# Patient Record
Sex: MALE | Race: OTHER | HISPANIC OR LATINO | URBAN - METROPOLITAN AREA
[De-identification: names, ages, dates, MRNs, and addresses within clinical notes are randomized per-mention and may not be internally consistent; named-entity substitution may affect disease eponyms.]

---

## 2024-01-03 ENCOUNTER — INPATIENT (INPATIENT)
Facility: HOSPITAL | Age: 69
LOS: 2 days | Discharge: HOME | DRG: 419 | End: 2024-01-06
Attending: SURGERY | Admitting: SURGERY
Payer: COMMERCIAL

## 2024-01-03 VITALS
HEART RATE: 77 BPM | TEMPERATURE: 98 F | DIASTOLIC BLOOD PRESSURE: 82 MMHG | SYSTOLIC BLOOD PRESSURE: 171 MMHG | RESPIRATION RATE: 20 BRPM | HEIGHT: 66 IN | WEIGHT: 169.98 LBS | OXYGEN SATURATION: 99 %

## 2024-01-03 PROCEDURE — 99285 EMERGENCY DEPT VISIT HI MDM: CPT

## 2024-01-03 NOTE — ED ADULT TRIAGE NOTE - MEANS OF ARRIVAL
Thank you for choosing the [facility] Developmental [facility] Behavioral Pediatrics Department for your care!     To Schedule appointments please contact the [facility] at [phone].   For refills please call the [facility] [phone] or contact us via your [employer] account.  Please allow 5-7 days for your refill request to be processed and sent to your pharmacy.   For behavioral emergencies (immediate concern for your child s safety or the safety of another) please contact the [facility] at [phone], go to your local Emergency Department or call [phone].     For non-emergencies contact the [facility] at [phone] or reach out to us via [employer]. Please allow 3 business days for a response.     ambulatory

## 2024-01-04 ENCOUNTER — TRANSCRIPTION ENCOUNTER (OUTPATIENT)
Age: 69
End: 2024-01-04

## 2024-01-04 DIAGNOSIS — K81.0 ACUTE CHOLECYSTITIS: ICD-10-CM

## 2024-01-04 DIAGNOSIS — Z90.49 ACQUIRED ABSENCE OF OTHER SPECIFIED PARTS OF DIGESTIVE TRACT: Chronic | ICD-10-CM

## 2024-01-04 LAB
ALBUMIN SERPL ELPH-MCNC: 4 G/DL — SIGNIFICANT CHANGE UP (ref 3.3–5)
ALBUMIN SERPL ELPH-MCNC: 4 G/DL — SIGNIFICANT CHANGE UP (ref 3.3–5)
ALP SERPL-CCNC: 83 U/L — SIGNIFICANT CHANGE UP (ref 40–120)
ALP SERPL-CCNC: 83 U/L — SIGNIFICANT CHANGE UP (ref 40–120)
ALT FLD-CCNC: 26 U/L — SIGNIFICANT CHANGE UP (ref 10–45)
ALT FLD-CCNC: 26 U/L — SIGNIFICANT CHANGE UP (ref 10–45)
ANION GAP SERPL CALC-SCNC: 11 MMOL/L — SIGNIFICANT CHANGE UP (ref 5–17)
ANION GAP SERPL CALC-SCNC: 11 MMOL/L — SIGNIFICANT CHANGE UP (ref 5–17)
APTT BLD: 29.4 SEC — SIGNIFICANT CHANGE UP (ref 24.5–35.6)
APTT BLD: 29.4 SEC — SIGNIFICANT CHANGE UP (ref 24.5–35.6)
AST SERPL-CCNC: 24 U/L — SIGNIFICANT CHANGE UP (ref 10–40)
AST SERPL-CCNC: 24 U/L — SIGNIFICANT CHANGE UP (ref 10–40)
BASOPHILS # BLD AUTO: 0.05 K/UL — SIGNIFICANT CHANGE UP (ref 0–0.2)
BASOPHILS # BLD AUTO: 0.05 K/UL — SIGNIFICANT CHANGE UP (ref 0–0.2)
BASOPHILS NFR BLD AUTO: 0.3 % — SIGNIFICANT CHANGE UP (ref 0–2)
BASOPHILS NFR BLD AUTO: 0.3 % — SIGNIFICANT CHANGE UP (ref 0–2)
BILIRUB SERPL-MCNC: 0.2 MG/DL — SIGNIFICANT CHANGE UP (ref 0.2–1.2)
BILIRUB SERPL-MCNC: 0.2 MG/DL — SIGNIFICANT CHANGE UP (ref 0.2–1.2)
BLD GP AB SCN SERPL QL: NEGATIVE — SIGNIFICANT CHANGE UP
BLD GP AB SCN SERPL QL: NEGATIVE — SIGNIFICANT CHANGE UP
BUN SERPL-MCNC: 16 MG/DL — SIGNIFICANT CHANGE UP (ref 7–23)
BUN SERPL-MCNC: 16 MG/DL — SIGNIFICANT CHANGE UP (ref 7–23)
CALCIUM SERPL-MCNC: 9.4 MG/DL — SIGNIFICANT CHANGE UP (ref 8.4–10.5)
CALCIUM SERPL-MCNC: 9.4 MG/DL — SIGNIFICANT CHANGE UP (ref 8.4–10.5)
CHLORIDE SERPL-SCNC: 104 MMOL/L — SIGNIFICANT CHANGE UP (ref 96–108)
CHLORIDE SERPL-SCNC: 104 MMOL/L — SIGNIFICANT CHANGE UP (ref 96–108)
CO2 SERPL-SCNC: 23 MMOL/L — SIGNIFICANT CHANGE UP (ref 22–31)
CO2 SERPL-SCNC: 23 MMOL/L — SIGNIFICANT CHANGE UP (ref 22–31)
CREAT SERPL-MCNC: 0.78 MG/DL — SIGNIFICANT CHANGE UP (ref 0.5–1.3)
CREAT SERPL-MCNC: 0.78 MG/DL — SIGNIFICANT CHANGE UP (ref 0.5–1.3)
EGFR: 98 ML/MIN/1.73M2 — SIGNIFICANT CHANGE UP
EGFR: 98 ML/MIN/1.73M2 — SIGNIFICANT CHANGE UP
EOSINOPHIL # BLD AUTO: 0 K/UL — SIGNIFICANT CHANGE UP (ref 0–0.5)
EOSINOPHIL # BLD AUTO: 0 K/UL — SIGNIFICANT CHANGE UP (ref 0–0.5)
EOSINOPHIL NFR BLD AUTO: 0 % — SIGNIFICANT CHANGE UP (ref 0–6)
EOSINOPHIL NFR BLD AUTO: 0 % — SIGNIFICANT CHANGE UP (ref 0–6)
GLUCOSE SERPL-MCNC: 139 MG/DL — HIGH (ref 70–99)
GLUCOSE SERPL-MCNC: 139 MG/DL — HIGH (ref 70–99)
HCT VFR BLD CALC: 42.8 % — SIGNIFICANT CHANGE UP (ref 39–50)
HCT VFR BLD CALC: 42.8 % — SIGNIFICANT CHANGE UP (ref 39–50)
HGB BLD-MCNC: 14 G/DL — SIGNIFICANT CHANGE UP (ref 13–17)
HGB BLD-MCNC: 14 G/DL — SIGNIFICANT CHANGE UP (ref 13–17)
IMM GRANULOCYTES NFR BLD AUTO: 0.7 % — SIGNIFICANT CHANGE UP (ref 0–0.9)
IMM GRANULOCYTES NFR BLD AUTO: 0.7 % — SIGNIFICANT CHANGE UP (ref 0–0.9)
INR BLD: 1.03 RATIO — SIGNIFICANT CHANGE UP (ref 0.85–1.18)
INR BLD: 1.03 RATIO — SIGNIFICANT CHANGE UP (ref 0.85–1.18)
LIDOCAIN IGE QN: 22 U/L — SIGNIFICANT CHANGE UP (ref 7–60)
LIDOCAIN IGE QN: 22 U/L — SIGNIFICANT CHANGE UP (ref 7–60)
LYMPHOCYTES # BLD AUTO: 0.5 K/UL — LOW (ref 1–3.3)
LYMPHOCYTES # BLD AUTO: 0.5 K/UL — LOW (ref 1–3.3)
LYMPHOCYTES # BLD AUTO: 2.8 % — LOW (ref 13–44)
LYMPHOCYTES # BLD AUTO: 2.8 % — LOW (ref 13–44)
MCHC RBC-ENTMCNC: 29.9 PG — SIGNIFICANT CHANGE UP (ref 27–34)
MCHC RBC-ENTMCNC: 29.9 PG — SIGNIFICANT CHANGE UP (ref 27–34)
MCHC RBC-ENTMCNC: 32.7 GM/DL — SIGNIFICANT CHANGE UP (ref 32–36)
MCHC RBC-ENTMCNC: 32.7 GM/DL — SIGNIFICANT CHANGE UP (ref 32–36)
MCV RBC AUTO: 91.5 FL — SIGNIFICANT CHANGE UP (ref 80–100)
MCV RBC AUTO: 91.5 FL — SIGNIFICANT CHANGE UP (ref 80–100)
MONOCYTES # BLD AUTO: 0.35 K/UL — SIGNIFICANT CHANGE UP (ref 0–0.9)
MONOCYTES # BLD AUTO: 0.35 K/UL — SIGNIFICANT CHANGE UP (ref 0–0.9)
MONOCYTES NFR BLD AUTO: 2 % — SIGNIFICANT CHANGE UP (ref 2–14)
MONOCYTES NFR BLD AUTO: 2 % — SIGNIFICANT CHANGE UP (ref 2–14)
NEUTROPHILS # BLD AUTO: 16.53 K/UL — HIGH (ref 1.8–7.4)
NEUTROPHILS # BLD AUTO: 16.53 K/UL — HIGH (ref 1.8–7.4)
NEUTROPHILS NFR BLD AUTO: 94.2 % — HIGH (ref 43–77)
NEUTROPHILS NFR BLD AUTO: 94.2 % — HIGH (ref 43–77)
NRBC # BLD: 0 /100 WBCS — SIGNIFICANT CHANGE UP (ref 0–0)
NRBC # BLD: 0 /100 WBCS — SIGNIFICANT CHANGE UP (ref 0–0)
PLATELET # BLD AUTO: 295 K/UL — SIGNIFICANT CHANGE UP (ref 150–400)
PLATELET # BLD AUTO: 295 K/UL — SIGNIFICANT CHANGE UP (ref 150–400)
POTASSIUM SERPL-MCNC: 4.2 MMOL/L — SIGNIFICANT CHANGE UP (ref 3.5–5.3)
POTASSIUM SERPL-MCNC: 4.2 MMOL/L — SIGNIFICANT CHANGE UP (ref 3.5–5.3)
POTASSIUM SERPL-SCNC: 4.2 MMOL/L — SIGNIFICANT CHANGE UP (ref 3.5–5.3)
POTASSIUM SERPL-SCNC: 4.2 MMOL/L — SIGNIFICANT CHANGE UP (ref 3.5–5.3)
PROT SERPL-MCNC: 7.5 G/DL — SIGNIFICANT CHANGE UP (ref 6–8.3)
PROT SERPL-MCNC: 7.5 G/DL — SIGNIFICANT CHANGE UP (ref 6–8.3)
PROTHROM AB SERPL-ACNC: 11.3 SEC — SIGNIFICANT CHANGE UP (ref 9.5–13)
PROTHROM AB SERPL-ACNC: 11.3 SEC — SIGNIFICANT CHANGE UP (ref 9.5–13)
RBC # BLD: 4.68 M/UL — SIGNIFICANT CHANGE UP (ref 4.2–5.8)
RBC # BLD: 4.68 M/UL — SIGNIFICANT CHANGE UP (ref 4.2–5.8)
RBC # FLD: 13.5 % — SIGNIFICANT CHANGE UP (ref 10.3–14.5)
RBC # FLD: 13.5 % — SIGNIFICANT CHANGE UP (ref 10.3–14.5)
RH IG SCN BLD-IMP: POSITIVE — SIGNIFICANT CHANGE UP
RH IG SCN BLD-IMP: POSITIVE — SIGNIFICANT CHANGE UP
SODIUM SERPL-SCNC: 138 MMOL/L — SIGNIFICANT CHANGE UP (ref 135–145)
SODIUM SERPL-SCNC: 138 MMOL/L — SIGNIFICANT CHANGE UP (ref 135–145)
WBC # BLD: 17.55 K/UL — HIGH (ref 3.8–10.5)
WBC # BLD: 17.55 K/UL — HIGH (ref 3.8–10.5)
WBC # FLD AUTO: 17.55 K/UL — HIGH (ref 3.8–10.5)
WBC # FLD AUTO: 17.55 K/UL — HIGH (ref 3.8–10.5)

## 2024-01-04 PROCEDURE — 76705 ECHO EXAM OF ABDOMEN: CPT | Mod: 26

## 2024-01-04 PROCEDURE — 71046 X-RAY EXAM CHEST 2 VIEWS: CPT | Mod: 26

## 2024-01-04 PROCEDURE — 78227 HEPATOBIL SYST IMAGE W/DRUG: CPT | Mod: 26

## 2024-01-04 PROCEDURE — 99222 1ST HOSP IP/OBS MODERATE 55: CPT | Mod: 57

## 2024-01-04 PROCEDURE — 74177 CT ABD & PELVIS W/CONTRAST: CPT | Mod: 26,MA

## 2024-01-04 RX ORDER — ACETAMINOPHEN 500 MG
1000 TABLET ORAL EVERY 6 HOURS
Refills: 0 | Status: DISCONTINUED | OUTPATIENT
Start: 2024-01-04 | End: 2024-01-05

## 2024-01-04 RX ORDER — SODIUM CHLORIDE 9 MG/ML
1000 INJECTION, SOLUTION INTRAVENOUS
Refills: 0 | Status: DISCONTINUED | OUTPATIENT
Start: 2024-01-04 | End: 2024-01-04

## 2024-01-04 RX ORDER — ERTAPENEM SODIUM 1 G/1
1000 INJECTION, POWDER, LYOPHILIZED, FOR SOLUTION INTRAMUSCULAR; INTRAVENOUS EVERY 24 HOURS
Refills: 0 | Status: DISCONTINUED | OUTPATIENT
Start: 2024-01-05 | End: 2024-01-05

## 2024-01-04 RX ORDER — SUCRALFATE 1 G
1 TABLET ORAL ONCE
Refills: 0 | Status: COMPLETED | OUTPATIENT
Start: 2024-01-04 | End: 2024-01-04

## 2024-01-04 RX ORDER — LIDOCAINE 4 G/100G
10 CREAM TOPICAL ONCE
Refills: 0 | Status: COMPLETED | OUTPATIENT
Start: 2024-01-04 | End: 2024-01-04

## 2024-01-04 RX ORDER — ERTAPENEM SODIUM 1 G/1
1000 INJECTION, POWDER, LYOPHILIZED, FOR SOLUTION INTRAMUSCULAR; INTRAVENOUS ONCE
Refills: 0 | Status: COMPLETED | OUTPATIENT
Start: 2024-01-04 | End: 2024-01-04

## 2024-01-04 RX ORDER — SODIUM CHLORIDE 9 MG/ML
1000 INJECTION, SOLUTION INTRAVENOUS
Refills: 0 | Status: COMPLETED | OUTPATIENT
Start: 2024-01-04 | End: 2024-12-02

## 2024-01-04 RX ORDER — SODIUM CHLORIDE 9 MG/ML
1000 INJECTION INTRAMUSCULAR; INTRAVENOUS; SUBCUTANEOUS ONCE
Refills: 0 | Status: COMPLETED | OUTPATIENT
Start: 2024-01-04 | End: 2024-01-04

## 2024-01-04 RX ORDER — ACETAMINOPHEN 500 MG
1000 TABLET ORAL ONCE
Refills: 0 | Status: COMPLETED | OUTPATIENT
Start: 2024-01-04 | End: 2024-01-04

## 2024-01-04 RX ORDER — ENOXAPARIN SODIUM 100 MG/ML
40 INJECTION SUBCUTANEOUS EVERY 24 HOURS
Refills: 0 | Status: DISCONTINUED | OUTPATIENT
Start: 2024-01-04 | End: 2024-01-05

## 2024-01-04 RX ADMIN — SODIUM CHLORIDE 115 MILLILITER(S): 9 INJECTION, SOLUTION INTRAVENOUS at 15:35

## 2024-01-04 RX ADMIN — ERTAPENEM SODIUM 1000 MILLIGRAM(S): 1 INJECTION, POWDER, LYOPHILIZED, FOR SOLUTION INTRAMUSCULAR; INTRAVENOUS at 10:47

## 2024-01-04 RX ADMIN — SODIUM CHLORIDE 1000 MILLILITER(S): 9 INJECTION INTRAMUSCULAR; INTRAVENOUS; SUBCUTANEOUS at 03:12

## 2024-01-04 RX ADMIN — Medication 400 MILLIGRAM(S): at 03:12

## 2024-01-04 RX ADMIN — ENOXAPARIN SODIUM 40 MILLIGRAM(S): 100 INJECTION SUBCUTANEOUS at 15:35

## 2024-01-04 RX ADMIN — Medication 1 GRAM(S): at 03:29

## 2024-01-04 RX ADMIN — Medication 30 MILLILITER(S): at 03:28

## 2024-01-04 RX ADMIN — ERTAPENEM SODIUM 120 MILLIGRAM(S): 1 INJECTION, POWDER, LYOPHILIZED, FOR SOLUTION INTRAMUSCULAR; INTRAVENOUS at 10:17

## 2024-01-04 RX ADMIN — LIDOCAINE 10 MILLILITER(S): 4 CREAM TOPICAL at 03:28

## 2024-01-04 NOTE — ED ADULT NURSE REASSESSMENT NOTE - NS ED NURSE REASSESS COMMENT FT1
Report taken from JENIFER Bryan. Pt introduced to oncoming RN and updated on plan of care. A&Ox4, breathing unlabored, skin warm dry and intact. Reports still feeling abd pain, but improved. Call bell in reach, pt educated on use. Bed locked and in lowest position. Denies any other needs or complaints at this time. Pending CT scan. Report taken from JENIFER Bryna. Pt introduced to oncoming RN and updated on plan of care. A&Ox4, breathing unlabored, skin warm dry and intact. Reports still feeling abd pain, but improved. Call bell in reach, pt educated on use. Bed locked and in lowest position. Denies any other needs or complaints at this time. Pending CT scan.

## 2024-01-04 NOTE — H&P ADULT - VTE RISK ASSESSMENT
[facility] CALLED AND SAID THE LORAZEPAM 1MG NEEDS PRIOR AUTHORIZATION    PRIOR AUTH #  [id number]  [facility]  [phone] VTE Assessment already completed for this visit

## 2024-01-04 NOTE — PRE PROCEDURE NOTE - PRE PROCEDURE EVALUATION
PRE-OPERATIVE PROCEDURE NOTE      Patient Age: 68y    Patient Gender: male    Procedure: Laparoscopic Cholecystectomy possible Open    Attending Surgeon: Dr. Guevara    Diagnosis/Indication: Acute Cholecystitis     Pertinent Medical History: Specified in H&P    Pertinent labs:                      14.0   17.55 )-----------( 295      ( 04 Jan 2024 03:17 )             42.8       01-04    138  |  104  |  16  ----------------------------<  139<H>  4.2   |  23  |  0.78    Ca    9.4      04 Jan 2024 03:17    TPro  7.5  /  Alb  4.0  /  TBili  0.2  /  DBili  x   /  AST  24  /  ALT  26  /  AlkPhos  83  01-04      PT/INR - ( 04 Jan 2024 03:17 )   PT: 11.3 sec;   INR: 1.03 ratio         PTT - ( 04 Jan 2024 03:17 )  PTT:29.4 sec    Patient and Family Aware ? Yes    Plan:  - Consent obtained  - Preop labs ordered  - NPO at midnight, will start IVF    ACS Trauma [id number]                                           PRE-OPERATIVE PROCEDURE NOTE      Patient Age: 68y    Patient Gender: male    Procedure: Laparoscopic Cholecystectomy possible Open    Attending Surgeon: Dr. Guevara    Diagnosis/Indication: Acute Cholecystitis     Pertinent Medical History: Specified in H&P    Pertinent labs:                      14.0   17.55 )-----------( 295      ( 04 Jan 2024 03:17 )             42.8       01-04    138  |  104  |  16  ----------------------------<  139<H>  4.2   |  23  |  0.78    Ca    9.4      04 Jan 2024 03:17    TPro  7.5  /  Alb  4.0  /  TBili  0.2  /  DBili  x   /  AST  24  /  ALT  26  /  AlkPhos  83  01-04      PT/INR - ( 04 Jan 2024 03:17 )   PT: 11.3 sec;   INR: 1.03 ratio         PTT - ( 04 Jan 2024 03:17 )  PTT:29.4 sec    Patient and Family Aware ? Yes    Plan:  - Consent obtained  - Preop labs ordered  - NPO at midnight, will start IVF    ACS Trauma [id number]

## 2024-01-04 NOTE — H&P ADULT - ATTENDING COMMENTS
ATTENDING ATTESTATION  I have seen and examined this patient with the resident housestaff. I have reviewed all labs, imaging and reports. I have participated in formulating the plan, and have read and agree with the history, ROS, exam, assessment and plan as stated above.     Dx: acute cholecystitis  epigastric pain for 1 day with RUQ tenderness.   WBC 17  lft's nml  US: sludge, CBD 4mm  + HIDA  Plan for admission with abx, schedule for lap garcia tomorrow given very long add-on list on day of presentation.     Total time spent in the care of this patient today (excluding critical care, teaching & procedures): 55 min                 Over 50% of the total time was spent on counseling and coordination of care.     Jessica Mcbride M.D., M.S.  Division of Acute Care Surgery

## 2024-01-04 NOTE — H&P ADULT - ASSESSMENT
68 year old male with likely acute cholecystitis based on clinical presentation and CT radiographic findings. Presenting with leukocytosis.    Plan:  - Admit to Acute Care Surgery, Dr. Mcbride  - Will book for OR tomorrow - laparoscopic cholecystectomy, possible open  - NPO  - IV fluid resuscitation  - IV abx: ertapenem  - Pain control  - Med rec completed - patient denies taking any prescription medications at home, only takes TUMS occasionally for GERD symptoms    Discussed with acute care surgery attending on call, Dr. Mcbride.      ACS/Trauma Surgery  [id number] 68 year old male with likely acute cholecystitis based on clinical presentation and CT radiographic findings. Presenting with leukocytosis.    Plan:  - Admit to Acute Care Surgery, Dr. Mcbride  - Will book for OR tomorrow - laparoscopic cholecystectomy, possible open  - NPO  - IV fluid resuscitation  - IV abx: ertapenem  - Pain control  - Med rec completed - patient denies taking any prescription medications at home, only takes TUMS occasionally for GERD symptoms    Discussed with acute care surgery attending on call, Dr. Mcbride.      ACS/Trauma Surgery  [id number]

## 2024-01-04 NOTE — PATIENT PROFILE ADULT - FALL HARM RISK - UNIVERSAL INTERVENTIONS
Bed in lowest position, wheels locked, appropriate side rails in place/Call bell, personal items and telephone in reach/Instruct patient to call for assistance before getting out of bed or chair/Non-slip footwear when patient is out of bed/[place] to call system/Physically safe environment - no spills, clutter or unnecessary equipment/Purposeful Proactive Rounding/Room/bathroom lighting operational, light cord in reach Bed in lowest position, wheels locked, appropriate side rails in place/Call bell, personal items and telephone in reach/Instruct patient to call for assistance before getting out of bed or chair/Non-slip footwear when patient is out of bed/[place] to call system/Physically safe environment - no spills, clutter or unnecessary equipment/Purposeful Proactive Rounding/Room/bathroom lighting operational, light cord in reach

## 2024-01-04 NOTE — ED ADULT NURSE NOTE - NSFALLUNIVINTERV_ED_ALL_ED
Bed/Stretcher in lowest position, wheels locked, appropriate side rails in place/Call bell, personal items and telephone in reach/Instruct patient to call for assistance before getting out of bed/chair/stretcher/Non-slip footwear applied when patient is off stretcher/[place] to call system/Physically safe environment - no spills, clutter or unnecessary equipment/Purposeful proactive rounding/Room/bathroom lighting operational, light cord in reach Bed/Stretcher in lowest position, wheels locked, appropriate side rails in place/Call bell, personal items and telephone in reach/Instruct patient to call for assistance before getting out of bed/chair/stretcher/Non-slip footwear applied when patient is off stretcher/[place] to call system/Physically safe environment - no spills, clutter or unnecessary equipment/Purposeful proactive rounding/Room/bathroom lighting operational, light cord in reach

## 2024-01-04 NOTE — ED PROVIDER NOTE - PROGRESS NOTE DETAILS
I received signout on this patient at the usual time, patient was reassessed at bedside, patient reports pain is improved, no nausea or vomiting at this time.  Patient abdomen is soft nontender was "planing of epigastric tenderness prior.  Patient has no right upper quadrant pain, no tenderness or Tiwari sign.  Ultrasound right upper quadrant shows gallbladder sludge with no other sonographic evidence of acute cholecystitis, lipase within normal limits.  Patient pending CT abdomen pelvis giving WBC 17.55. CT shows concern for acute cholecystitis, patient has been n.p.o. while in the ED for over 12 hours, given IV ertapenem, discussed with surgical resident.  HIDA scan ordered. Pt pain controlled at this time, VSS. -Christian Avila PA-C surgery resident requests admission to Dr. Mcbride. rec continue with HIDA daquan. -Christian Avila PA-C

## 2024-01-04 NOTE — H&P ADULT - NSHPLABSRESULTS_GEN_ALL_CORE
14.0   17.55 )-----------( 295      ( 04 Jan 2024 03:17 )             42.8     01-04    138  |  104  |  16  ----------------------------<  139<H>  4.2   |  23  |  0.78    Ca    9.4      04 Jan 2024 03:17    TPro  7.5  /  Alb  4.0  /  TBili  0.2  /  DBili  x   /  AST  24  /  ALT  26  /  AlkPhos  83  01-04      IMAGING:  < from: CT Abdomen and Pelvis w/ IV Cont (01.04.24 @ 08:43) >      ACC: [id number] EXAM:  CT ABDOMEN AND PELVIS IC   ORDERED BY: EDELMIRA HORVATH     PROCEDURE DATE:  01/04/2024          INTERPRETATION:  CLINICAL INFORMATION: Epigastric abdominal pain, high   WBC count.    COMPARISON: Ultrasound abdomen 1/4/2024.    CONTRAST/COMPLICATIONS:  IV Contrast: Omnipaque 350  90 cc administered   10 cc discarded  Oral Contrast: NONE  Complications: None reported at time of study completion    PROCEDURE:  CT of the Abdomen and Pelvis was performed.  Sagittal and coronal reformats were performed.    FINDINGS:  LOWER CHEST: Bibasilar subsegmental atelectasis.    LIVER: Within normal limits.  BILE DUCTS: Normal caliber.  GALLBLADDER: Distended with cholelithiasis. Gallbladder wall thickening   and pericholecystic fat stranding.  SPLEEN: Within normal limits.  PANCREAS: Within normal limits.  ADRENALS: Within normal limits.  KIDNEYS/URETERS: No hydronephrosis. Bilateral renal cysts, the largest   measuring up to 9.2 cm in the left lower pole. Bilateral subcentimeter   hypodensities too small to characterize.    BLADDER: Within normal limits.  REPRODUCTIVE ORGANS: Prostate is enlarged.    BOWEL: No bowel obstruction. Appendix is not visualized. No evidence of   inflammation in the pericecal region. Colonic diverticulosis without   evidence of acute diverticulitis.  PERITONEUM: No ascites.  VESSELS: Atherosclerotic changes.  RETROPERITONEUM/LYMPH NODES: No lymphadenopathy.  ABDOMINAL WALL: Small fat-containing right inguinal hernia.  BONES: Degenerative changes.    IMPRESSION:  Findings suspicious for acute cholecystitis.        --- End of Report ---          LEONIDES GARCIA DO; Resident Radiologist  This document has been electronically signed.  CHARMAINE REID MD; Attending Radiologist  This document has beenelectronically signed. Jan 4 2024  9:40AM    < end of copied text >      < from: US Abdomen Upper Quadrant Right (01.04.24 @ 04:41) >        < end of copied text > 14.0   17.55 )-----------( 295      ( 04 Jan 2024 03:17 )             42.8     01-04    138  |  104  |  16  ----------------------------<  139<H>  4.2   |  23  |  0.78    Ca    9.4      04 Jan 2024 03:17    TPro  7.5  /  Alb  4.0  /  TBili  0.2  /  DBili  x   /  AST  24  /  ALT  26  /  AlkPhos  83  01-04      IMAGING:  < from: CT Abdomen and Pelvis w/ IV Cont (01.04.24 @ 08:43) >      ACC: [id number] EXAM:  CT ABDOMEN AND PELVIS IC   ORDERED BY: EDELMIRA HORVATH     PROCEDURE DATE:  01/04/2024          INTERPRETATION:  CLINICAL INFORMATION: Epigastric abdominal pain, high   WBC count.    COMPARISON: Ultrasound abdomen 1/4/2024.    CONTRAST/COMPLICATIONS:  IV Contrast: Omnipaque 350  90 cc administered   10 cc discarded  Oral Contrast: NONE  Complications: None reported at time of study completion    PROCEDURE:  CT of the Abdomen and Pelvis was performed.  Sagittal and coronal reformats were performed.    FINDINGS:  LOWER CHEST: Bibasilar subsegmental atelectasis.    LIVER: Within normal limits.  BILE DUCTS: Normal caliber.  GALLBLADDER: Distended with cholelithiasis. Gallbladder wall thickening   and pericholecystic fat stranding.  SPLEEN: Within normal limits.  PANCREAS: Within normal limits.  ADRENALS: Within normal limits.  KIDNEYS/URETERS: No hydronephrosis. Bilateral renal cysts, the largest   measuring up to 9.2 cm in the left lower pole. Bilateral subcentimeter   hypodensities too small to characterize.    BLADDER: Within normal limits.  REPRODUCTIVE ORGANS: Prostate is enlarged.    BOWEL: No bowel obstruction. Appendix is not visualized. No evidence of   inflammation in the pericecal region. Colonic diverticulosis without   evidence of acute diverticulitis.  PERITONEUM: No ascites.  VESSELS: Atherosclerotic changes.  RETROPERITONEUM/LYMPH NODES: No lymphadenopathy.  ABDOMINAL WALL: Small fat-containing right inguinal hernia.  BONES: Degenerative changes.    IMPRESSION:  Findings suspicious for acute cholecystitis.        --- End of Report ---          LEONIDES GARCIA DO; Resident Radiologist  This document has been electronically signed.  CHARMAINE REID MD; Attending Radiologist  This document has beenelectronically signed. Jan 4 2024  9:40AM    < end of copied text >      < from: US Abdomen Upper Quadrant Right (01.04.24 @ 04:41) >        < end of copied text >

## 2024-01-04 NOTE — ED ADULT NURSE NOTE - OBJECTIVE STATEMENT
68y male w/ pmh of GERD presents to ED w/ abdominal pain. Pt states he felt mild pain in his abdomen at 6 pm that has since progressed to severe pain. Pain is located in epigastric region; abdomen soft non distended non tender on palpation. Pt states he vomited three times on way to hospital. Pt endorses chills but did not take temperature. Pt denies diarrhea, urinary symptoms, chest pain, shortness of breath. Pt is ambulatory

## 2024-01-04 NOTE — H&P ADULT - NSHPPHYSICALEXAM_GEN_ALL_CORE
Gen: NAD  CV: Regular rate  Resp: Nonlabored breathing on room air  Abd: Soft, nondistended, TTP in RUQ, well healed RLQ incision  Ext: Warm

## 2024-01-04 NOTE — ED ADULT NURSE NOTE - NS ED NURSE RECORD ANOTHER VITAL SIGN
A1c 7.6%, this is an increase from 6.5% 3 years ago  The goal of A1c will be under 7%, please continue taking metformin and start semaglutide/Ozempic as prescribed  Follow-up in 1 month    Very low vitamin D  I prescribed vitamin D3 50,000 units once a week for 12 weeks  Triglycerides are very high, since you have diabetes you are supposed to be on cholesterol-lowering medications, I prescribed atorvastatin 10 mg once a day  Recommended regular exercise, such as walking 30 minutes daily  Recommended low fat Mediterranean diet: use [place] Virgin Olive oil for cooking and salads dressing, baked/seared chicken , baked/seared fish, nuts, low fat cheese (feta) in small amounts, whole grain bread, hummus, chickpeas, 5 different fruit and vegetables of 5 different colors daily  Kidneys and liver tests are normal  Blood count is normal no anemia  Thyroid test is normal   Yes

## 2024-01-04 NOTE — ED PROVIDER NOTE - ATTENDING CONTRIBUTION TO CARE
Hx: pt with wife at bedside with epigastric pain 30min post eating for several hours now, has had this postprandial pain in past, rx omeprazole, not helping today.  Sharp pain radiating to back.  No fever, vomiting, diarrhea.      PE: well appearing, nontoxic, no respiratory distress.  Neuro nonfocal.  Skin intact. Psych normal mood.  +td epigastric.  Neg Tiwari's sign.  No guarding/rebound.    MDM: epigastric pain, postprandial, r/o perforation, PUD, cholecystitis, pancreatitis, hepatitis.  check cbc r/o anemia or leukocytosis, check bmp to r/o metabolic derangement and lyte imbalance, u/s ruq, cxr r/o free air, meds.

## 2024-01-04 NOTE — PATIENT PROFILE ADULT - NSPROPTRIGHTBILLOFRIGHTS_GEN_A_NUR
Dawna Rubin (MD)  Surgery; Surgical Critical Care; Vascular Surgery  [address], [address]  [place] [address]  Phone: [phone]  Fax: [phone]  Follow Up Time: 1-3 Days
patient

## 2024-01-04 NOTE — H&P ADULT - HISTORY OF PRESENT ILLNESS
Patient is a 68 year old male with no significant PMHx per report presenting with epigastric abdominal pain for the past day. Associated nausea and vomiting. Denies diarrhea, fevers, and chills. Pain was after eating yesterday and has been persistent. Denies prior history of gallstones. Past surgical history for open appendectomy approximately 20 years ago. In ED, CT imaging findings concerning for acute cholecystitis.

## 2024-01-05 ENCOUNTER — TRANSCRIPTION ENCOUNTER (OUTPATIENT)
Age: 69
End: 2024-01-05

## 2024-01-05 LAB
ALBUMIN SERPL ELPH-MCNC: 3.5 G/DL — SIGNIFICANT CHANGE UP (ref 3.3–5)
ALBUMIN SERPL ELPH-MCNC: 3.5 G/DL — SIGNIFICANT CHANGE UP (ref 3.3–5)
ALP SERPL-CCNC: 73 U/L — SIGNIFICANT CHANGE UP (ref 40–120)
ALP SERPL-CCNC: 73 U/L — SIGNIFICANT CHANGE UP (ref 40–120)
ALT FLD-CCNC: 23 U/L — SIGNIFICANT CHANGE UP (ref 10–45)
ALT FLD-CCNC: 23 U/L — SIGNIFICANT CHANGE UP (ref 10–45)
ANION GAP SERPL CALC-SCNC: 5 MMOL/L — SIGNIFICANT CHANGE UP (ref 5–17)
ANION GAP SERPL CALC-SCNC: 5 MMOL/L — SIGNIFICANT CHANGE UP (ref 5–17)
APTT BLD: 29.6 SEC — SIGNIFICANT CHANGE UP (ref 24.5–35.6)
APTT BLD: 29.6 SEC — SIGNIFICANT CHANGE UP (ref 24.5–35.6)
AST SERPL-CCNC: 24 U/L — SIGNIFICANT CHANGE UP (ref 10–40)
AST SERPL-CCNC: 24 U/L — SIGNIFICANT CHANGE UP (ref 10–40)
BILIRUB SERPL-MCNC: 0.3 MG/DL — SIGNIFICANT CHANGE UP (ref 0.2–1.2)
BILIRUB SERPL-MCNC: 0.3 MG/DL — SIGNIFICANT CHANGE UP (ref 0.2–1.2)
BLD GP AB SCN SERPL QL: NEGATIVE — SIGNIFICANT CHANGE UP
BLD GP AB SCN SERPL QL: NEGATIVE — SIGNIFICANT CHANGE UP
BUN SERPL-MCNC: 10 MG/DL — SIGNIFICANT CHANGE UP (ref 7–23)
BUN SERPL-MCNC: 10 MG/DL — SIGNIFICANT CHANGE UP (ref 7–23)
CALCIUM SERPL-MCNC: 8.7 MG/DL — SIGNIFICANT CHANGE UP (ref 8.4–10.5)
CALCIUM SERPL-MCNC: 8.7 MG/DL — SIGNIFICANT CHANGE UP (ref 8.4–10.5)
CHLORIDE SERPL-SCNC: 106 MMOL/L — SIGNIFICANT CHANGE UP (ref 96–108)
CHLORIDE SERPL-SCNC: 106 MMOL/L — SIGNIFICANT CHANGE UP (ref 96–108)
CO2 SERPL-SCNC: 27 MMOL/L — SIGNIFICANT CHANGE UP (ref 22–31)
CO2 SERPL-SCNC: 27 MMOL/L — SIGNIFICANT CHANGE UP (ref 22–31)
CREAT SERPL-MCNC: 0.84 MG/DL — SIGNIFICANT CHANGE UP (ref 0.5–1.3)
CREAT SERPL-MCNC: 0.84 MG/DL — SIGNIFICANT CHANGE UP (ref 0.5–1.3)
EGFR: 96 ML/MIN/1.73M2 — SIGNIFICANT CHANGE UP
EGFR: 96 ML/MIN/1.73M2 — SIGNIFICANT CHANGE UP
GLUCOSE SERPL-MCNC: 100 MG/DL — HIGH (ref 70–99)
GLUCOSE SERPL-MCNC: 100 MG/DL — HIGH (ref 70–99)
HCT VFR BLD CALC: 39.3 % — SIGNIFICANT CHANGE UP (ref 39–50)
HCT VFR BLD CALC: 39.3 % — SIGNIFICANT CHANGE UP (ref 39–50)
HCV AB S/CO SERPL IA: 0.19 S/CO — SIGNIFICANT CHANGE UP (ref 0–0.99)
HCV AB S/CO SERPL IA: 0.19 S/CO — SIGNIFICANT CHANGE UP (ref 0–0.99)
HCV AB SERPL-IMP: SIGNIFICANT CHANGE UP
HCV AB SERPL-IMP: SIGNIFICANT CHANGE UP
HGB BLD-MCNC: 12.8 G/DL — LOW (ref 13–17)
HGB BLD-MCNC: 12.8 G/DL — LOW (ref 13–17)
INR BLD: 1.15 RATIO — SIGNIFICANT CHANGE UP (ref 0.85–1.18)
INR BLD: 1.15 RATIO — SIGNIFICANT CHANGE UP (ref 0.85–1.18)
MAGNESIUM SERPL-MCNC: 2.2 MG/DL — SIGNIFICANT CHANGE UP (ref 1.6–2.6)
MAGNESIUM SERPL-MCNC: 2.2 MG/DL — SIGNIFICANT CHANGE UP (ref 1.6–2.6)
MCHC RBC-ENTMCNC: 29.4 PG — SIGNIFICANT CHANGE UP (ref 27–34)
MCHC RBC-ENTMCNC: 29.4 PG — SIGNIFICANT CHANGE UP (ref 27–34)
MCHC RBC-ENTMCNC: 32.6 GM/DL — SIGNIFICANT CHANGE UP (ref 32–36)
MCHC RBC-ENTMCNC: 32.6 GM/DL — SIGNIFICANT CHANGE UP (ref 32–36)
MCV RBC AUTO: 90.3 FL — SIGNIFICANT CHANGE UP (ref 80–100)
MCV RBC AUTO: 90.3 FL — SIGNIFICANT CHANGE UP (ref 80–100)
NRBC # BLD: 0 /100 WBCS — SIGNIFICANT CHANGE UP (ref 0–0)
NRBC # BLD: 0 /100 WBCS — SIGNIFICANT CHANGE UP (ref 0–0)
PHOSPHATE SERPL-MCNC: 3 MG/DL — SIGNIFICANT CHANGE UP (ref 2.5–4.5)
PHOSPHATE SERPL-MCNC: 3 MG/DL — SIGNIFICANT CHANGE UP (ref 2.5–4.5)
PLATELET # BLD AUTO: 258 K/UL — SIGNIFICANT CHANGE UP (ref 150–400)
PLATELET # BLD AUTO: 258 K/UL — SIGNIFICANT CHANGE UP (ref 150–400)
POTASSIUM SERPL-MCNC: 5.3 MMOL/L — SIGNIFICANT CHANGE UP (ref 3.5–5.3)
POTASSIUM SERPL-MCNC: 5.3 MMOL/L — SIGNIFICANT CHANGE UP (ref 3.5–5.3)
POTASSIUM SERPL-SCNC: 5.3 MMOL/L — SIGNIFICANT CHANGE UP (ref 3.5–5.3)
POTASSIUM SERPL-SCNC: 5.3 MMOL/L — SIGNIFICANT CHANGE UP (ref 3.5–5.3)
PROT SERPL-MCNC: 6.5 G/DL — SIGNIFICANT CHANGE UP (ref 6–8.3)
PROT SERPL-MCNC: 6.5 G/DL — SIGNIFICANT CHANGE UP (ref 6–8.3)
PROTHROM AB SERPL-ACNC: 12 SEC — SIGNIFICANT CHANGE UP (ref 9.5–13)
PROTHROM AB SERPL-ACNC: 12 SEC — SIGNIFICANT CHANGE UP (ref 9.5–13)
RBC # BLD: 4.35 M/UL — SIGNIFICANT CHANGE UP (ref 4.2–5.8)
RBC # BLD: 4.35 M/UL — SIGNIFICANT CHANGE UP (ref 4.2–5.8)
RBC # FLD: 13.5 % — SIGNIFICANT CHANGE UP (ref 10.3–14.5)
RBC # FLD: 13.5 % — SIGNIFICANT CHANGE UP (ref 10.3–14.5)
RH IG SCN BLD-IMP: POSITIVE — SIGNIFICANT CHANGE UP
RH IG SCN BLD-IMP: POSITIVE — SIGNIFICANT CHANGE UP
SODIUM SERPL-SCNC: 138 MMOL/L — SIGNIFICANT CHANGE UP (ref 135–145)
SODIUM SERPL-SCNC: 138 MMOL/L — SIGNIFICANT CHANGE UP (ref 135–145)
WBC # BLD: 9.4 K/UL — SIGNIFICANT CHANGE UP (ref 3.8–10.5)
WBC # BLD: 9.4 K/UL — SIGNIFICANT CHANGE UP (ref 3.8–10.5)
WBC # FLD AUTO: 9.4 K/UL — SIGNIFICANT CHANGE UP (ref 3.8–10.5)
WBC # FLD AUTO: 9.4 K/UL — SIGNIFICANT CHANGE UP (ref 3.8–10.5)

## 2024-01-05 PROCEDURE — 47562 LAPAROSCOPIC CHOLECYSTECTOMY: CPT

## 2024-01-05 PROCEDURE — 99231 SBSQ HOSP IP/OBS SF/LOW 25: CPT | Mod: 24

## 2024-01-05 DEVICE — SURGICEL 2 X 14": Type: IMPLANTABLE DEVICE | Status: FUNCTIONAL

## 2024-01-05 DEVICE — LIGATING CLIPS WECK HEMOLOK POLYMER MEDIUM-LARGE (GREEN) 6: Type: IMPLANTABLE DEVICE | Status: FUNCTIONAL

## 2024-01-05 RX ORDER — OXYCODONE HYDROCHLORIDE 5 MG/1
2.5 TABLET ORAL EVERY 4 HOURS
Refills: 0 | Status: DISCONTINUED | OUTPATIENT
Start: 2024-01-05 | End: 2024-01-06

## 2024-01-05 RX ORDER — ACETAMINOPHEN 500 MG
975 TABLET ORAL EVERY 6 HOURS
Refills: 0 | Status: DISCONTINUED | OUTPATIENT
Start: 2024-01-05 | End: 2024-01-06

## 2024-01-05 RX ORDER — OXYCODONE HYDROCHLORIDE 5 MG/1
1 TABLET ORAL
Qty: 5 | Refills: 0
Start: 2024-01-05

## 2024-01-05 RX ORDER — ONDANSETRON 8 MG/1
4 TABLET, FILM COATED ORAL ONCE
Refills: 0 | Status: DISCONTINUED | OUTPATIENT
Start: 2024-01-05 | End: 2024-01-05

## 2024-01-05 RX ORDER — OXYCODONE HYDROCHLORIDE 5 MG/1
5 TABLET ORAL EVERY 4 HOURS
Refills: 0 | Status: DISCONTINUED | OUTPATIENT
Start: 2024-01-05 | End: 2024-01-06

## 2024-01-05 RX ORDER — HYDROMORPHONE HYDROCHLORIDE 2 MG/ML
0.5 INJECTION INTRAMUSCULAR; INTRAVENOUS; SUBCUTANEOUS
Refills: 0 | Status: DISCONTINUED | OUTPATIENT
Start: 2024-01-05 | End: 2024-01-05

## 2024-01-05 RX ORDER — SODIUM CHLORIDE 9 MG/ML
1000 INJECTION, SOLUTION INTRAVENOUS
Refills: 0 | Status: DISCONTINUED | OUTPATIENT
Start: 2024-01-05 | End: 2024-01-05

## 2024-01-05 RX ORDER — ENOXAPARIN SODIUM 100 MG/ML
40 INJECTION SUBCUTANEOUS EVERY 24 HOURS
Refills: 0 | Status: DISCONTINUED | OUTPATIENT
Start: 2024-01-05 | End: 2024-01-06

## 2024-01-05 RX ADMIN — ERTAPENEM SODIUM 120 MILLIGRAM(S): 1 INJECTION, POWDER, LYOPHILIZED, FOR SOLUTION INTRAMUSCULAR; INTRAVENOUS at 00:30

## 2024-01-05 RX ADMIN — Medication 975 MILLIGRAM(S): at 23:48

## 2024-01-05 RX ADMIN — SODIUM CHLORIDE 100 MILLILITER(S): 9 INJECTION, SOLUTION INTRAVENOUS at 00:31

## 2024-01-05 RX ADMIN — Medication 975 MILLIGRAM(S): at 23:46

## 2024-01-05 RX ADMIN — ENOXAPARIN SODIUM 40 MILLIGRAM(S): 100 INJECTION SUBCUTANEOUS at 14:27

## 2024-01-05 NOTE — DISCHARGE NOTE PROVIDER - HOSPITAL COURSE
68 year old male with no significant PMHx per report presenting with epigastric abdominal pain for the past day. Associated nausea and vomiting. Denies diarrhea, fevers, and chills. Pain was after eating yesterday and has been persistent. Denies prior history of gallstones. Past surgical history for open appendectomy approximately 20 years ago. In ED, CT imaging findings concerning for acute cholecystitis with WBC 17. Patient was admitted, started on invanz. Patient was taken to the OR on 1/5/2024 and underwent a laparoscopic cholecystectomy. Patient recovered well, was tolerating PO and pain was well-controlled at discharge.

## 2024-01-05 NOTE — PROGRESS NOTE ADULT - ASSESSMENT
68 year old male with likely acute cholecystitis based on clinical presentation and CT radiographic findings. Presenting with leukocytosis.    Plan:  - Plan for laparoscopic cholecystectomy, possible open today  - NPO  - IV fluid resuscitation  - IV abx: ertapenem  - Pain control  - Med rec completed - patient denies taking any prescription medications at home, only takes TUMS occasionally for GERD symptoms      ACS/Trauma Surgery  [id number]   68 year old male with likely acute cholecystitis based on clinical presentation and CT radiographic findings. Presenting with leukocytosis.    Plan:  - Plan for laparoscopic cholecystectomy, possible open today  - NPO  - IV fluid resuscitation  - IV abx: ertapenem  - Pain control  - Med rec completed - patient denies taking any prescription medications at home, only takes TUMS occasionally for GERD symptoms      ACS/Trauma Surgery  [id number]

## 2024-01-05 NOTE — DISCHARGE NOTE PROVIDER - CARE PROVIDER_API CALL
[facility]vara [facility]  [facility]gery  [address] [place] [address], [facility] [address]  [place] [address]  Phone: [phone]  Fax: [phone]  Follow Up Time:    [facility]  [address] [place] [address], [facility] [address]  [place] [address]  Phone: [phone]  Fax: [phone]  Follow Up Time:

## 2024-01-05 NOTE — PRE-ANESTHESIA EVALUATION ADULT - NSPREOPDXFT_GEN_ALL_CORE
Pt presents to ED via [facility] from [facility] with CC AMS and fever. Per pt's POA, this is not pt's normal mentation. Per EMS temp was 102. Pt had been given a total of 1300 mg of Tylenol PTA. Pt has hx of suprapubic cath & frequent UTI's. Nursing home had attempted IV placement to right arm, which was unsuccessful. Pt's POA (Anais) did not allow staff to place an additional IV. Pt denies pain. Hx MS.    cholecystitis

## 2024-01-05 NOTE — DISCHARGE NOTE PROVIDER - CARE PROVIDERS DIRECT ADDRESSES
[email or web address]

## 2024-01-06 ENCOUNTER — RESULT REVIEW (OUTPATIENT)
Age: 69
End: 2024-01-06

## 2024-01-06 ENCOUNTER — TRANSCRIPTION ENCOUNTER (OUTPATIENT)
Age: 69
End: 2024-01-06

## 2024-01-06 VITALS
TEMPERATURE: 98 F | SYSTOLIC BLOOD PRESSURE: 127 MMHG | HEART RATE: 61 BPM | RESPIRATION RATE: 18 BRPM | DIASTOLIC BLOOD PRESSURE: 68 MMHG | OXYGEN SATURATION: 96 %

## 2024-01-06 PROCEDURE — 86850 RBC ANTIBODY SCREEN: CPT

## 2024-01-06 PROCEDURE — 88304 TISSUE EXAM BY PATHOLOGIST: CPT | Mod: 26

## 2024-01-06 PROCEDURE — 85027 COMPLETE CBC AUTOMATED: CPT

## 2024-01-06 PROCEDURE — 85610 PROTHROMBIN TIME: CPT

## 2024-01-06 PROCEDURE — 85730 THROMBOPLASTIN TIME PARTIAL: CPT

## 2024-01-06 PROCEDURE — 71046 X-RAY EXAM CHEST 2 VIEWS: CPT

## 2024-01-06 PROCEDURE — 83735 ASSAY OF MAGNESIUM: CPT

## 2024-01-06 PROCEDURE — 96375 TX/PRO/DX INJ NEW DRUG ADDON: CPT

## 2024-01-06 PROCEDURE — 84100 ASSAY OF PHOSPHORUS: CPT

## 2024-01-06 PROCEDURE — 76705 ECHO EXAM OF ABDOMEN: CPT

## 2024-01-06 PROCEDURE — A9537: CPT

## 2024-01-06 PROCEDURE — 74177 CT ABD & PELVIS W/CONTRAST: CPT | Mod: MA

## 2024-01-06 PROCEDURE — 85025 COMPLETE CBC W/AUTO DIFF WBC: CPT

## 2024-01-06 PROCEDURE — 80053 COMPREHEN METABOLIC PANEL: CPT

## 2024-01-06 PROCEDURE — 88304 TISSUE EXAM BY PATHOLOGIST: CPT

## 2024-01-06 PROCEDURE — 96365 THER/PROPH/DIAG IV INF INIT: CPT

## 2024-01-06 PROCEDURE — 99285 EMERGENCY DEPT VISIT HI MDM: CPT | Mod: 25

## 2024-01-06 PROCEDURE — C9399: CPT

## 2024-01-06 PROCEDURE — C1889: CPT

## 2024-01-06 PROCEDURE — 86803 HEPATITIS C AB TEST: CPT

## 2024-01-06 PROCEDURE — 86900 BLOOD TYPING SEROLOGIC ABO: CPT

## 2024-01-06 PROCEDURE — 78227 HEPATOBIL SYST IMAGE W/DRUG: CPT | Mod: MA

## 2024-01-06 PROCEDURE — 86901 BLOOD TYPING SEROLOGIC RH(D): CPT

## 2024-01-06 PROCEDURE — 83690 ASSAY OF LIPASE: CPT

## 2024-01-06 RX ADMIN — Medication 975 MILLIGRAM(S): at 06:53

## 2024-01-06 RX ADMIN — Medication 975 MILLIGRAM(S): at 11:05

## 2024-01-06 RX ADMIN — ENOXAPARIN SODIUM 40 MILLIGRAM(S): 100 INJECTION SUBCUTANEOUS at 05:55

## 2024-01-06 RX ADMIN — Medication 975 MILLIGRAM(S): at 11:35

## 2024-01-06 RX ADMIN — Medication 975 MILLIGRAM(S): at 05:56

## 2024-01-06 NOTE — PROGRESS NOTE ADULT - SUBJECTIVE AND OBJECTIVE BOX
ACS TEAM SURGERY DAILY PROGRESS NOTE:     INTERVAL EVENTS: None    SUBJECTIVE/ROS: No acute events overnight. Patient seen and examined at bedside by surgical team.     OBJECTIVE:  Vital Signs Last 24 Hrs  T(C): 36.7 (06 Jan 2024 04:20), Max: 37.1 (06 Jan 2024 00:10)  T(F): 98 (06 Jan 2024 04:20), Max: 98.7 (06 Jan 2024 00:10)  HR: 63 (06 Jan 2024 04:20) (56 - 74)  BP: 111/54 (06 Jan 2024 04:20) (106/58 - 129/70)  BP(mean): 85 (05 Jan 2024 20:45) (76 - 89)  RR: 18 (06 Jan 2024 04:20) (16 - 18)  SpO2: 99% (06 Jan 2024 04:20) (93% - 100%)    Parameters below as of 06 Jan 2024 04:20  Patient On (Oxygen Delivery Method): room air                            12.8   9.40  )-----------( 258      ( 05 Jan 2024 03:29 )             39.3     01-05    138  |  106  |  10  ----------------------------<  100<H>  5.3   |  27  |  0.84    Ca    8.7      05 Jan 2024 03:29  Phos  3.0     01-05  Mg     2.2     01-05    TPro  6.5  /  Alb  3.5  /  TBili  0.3  /  DBili  x   /  AST  24  /  ALT  23  /  AlkPhos  73  01-05   PT/INR - ( 05 Jan 2024 03:29 )   PT: 12.0 sec;   INR: 1.15 ratio         PTT - ( 05 Jan 2024 03:29 )  PTT:29.6 sec  I&O's Detail    05 Jan 2024 07:01  -  06 Jan 2024 07:00  --------------------------------------------------------  IN:    Lactated Ringers: 1000 mL    Lactated Ringers: 150 mL    Oral Fluid: 150 mL  Total IN: 1300 mL    OUT:    Voided (mL): 3400 mL  Total OUT: 3400 mL    Total NET: -2100 mL      06 Jan 2024 07:01  -  06 Jan 2024 09:12  --------------------------------------------------------  IN:    Oral Fluid: 120 mL  Total IN: 120 mL    OUT:    Voided (mL): 300 mL  Total OUT: 300 mL    Total NET: -180 mL          IMAGING:      PHYSICAL EXAM:  Constitutional: NAD  Respiratory: non-labored breathing, patent airway  Gastrointestinal: abdomen soft, nontender, nondistended, incision c/d/i.  Extremities: warm  Neurological: intact          
SURGERY DAILY PROGRESS NOTE:     Overnight Events:  No acute events overnight.    SUBJECTIVE: Patient seen and evaluated on AM rounds. Pt is resting comfortably in bed with no complaints. Denies fever, chills, N/V, chest pain, or shortness of breath.    OBJECTIVE:  Vital Signs Last 24 Hrs  T(C): 36.7 (05 Jan 2024 05:01), Max: 36.8 (04 Jan 2024 21:00)  T(F): 98.1 (05 Jan 2024 05:01), Max: 98.2 (04 Jan 2024 21:00)  HR: 59 (05 Jan 2024 05:01) (54 - 59)  BP: 126/72 (05 Jan 2024 05:01) (126/72 - 153/74)  BP(mean): --  RR: 18 (05 Jan 2024 05:01) (14 - 18)  SpO2: 96% (05 Jan 2024 05:01) (95% - 99%)    Parameters below as of 05 Jan 2024 05:01  Patient On (Oxygen Delivery Method): room air      I&O's Detail    04 Jan 2024 07:01  -  05 Jan 2024 07:00  --------------------------------------------------------  IN:    IV PiggyBack: 50 mL    Lactated Ringers: 345 mL    Lactated Ringers: 800 mL    Oral Fluid: 240 mL  Total IN: 1435 mL    OUT:    Voided (mL): 1300 mL  Total OUT: 1300 mL    Total NET: 135 mL        Daily     Daily     LABS:                        12.8   9.40  )-----------( 258      ( 05 Jan 2024 03:29 )             39.3     01-05    138  |  106  |  10  ----------------------------<  100<H>  5.3   |  27  |  0.84    Ca    8.7      05 Jan 2024 03:29  Phos  3.0     01-05  Mg     2.2     01-05    TPro  6.5  /  Alb  3.5  /  TBili  0.3  /  DBili  x   /  AST  24  /  ALT  23  /  AlkPhos  73  01-05    PT/INR - ( 05 Jan 2024 03:29 )   PT: 12.0 sec;   INR: 1.15 ratio         PTT - ( 05 Jan 2024 03:29 )  PTT:29.6 sec  Urinalysis Basic - ( 05 Jan 2024 03:29 )    Color: x / Appearance: x / SG: x / pH: x  Gluc: 100 mg/dL / Ketone: x  / Bili: x / Urobili: x   Blood: x / Protein: x / Nitrite: x   Leuk Esterase: x / RBC: x / WBC x   Sq Epi: x / Non Sq Epi: x / Bacteria: x      Physical Exam:   Physical Exam: Gen: NAD  CV: Regular rate  Resp: Nonlabored breathing on room air  Abd: Soft, nondistended, TTP in RUQ, well healed RLQ incision  Ext: Warm

## 2024-01-06 NOTE — DISCHARGE NOTE NURSING/CASE MANAGEMENT/SOCIAL WORK - PATIENT PORTAL LINK FT
You can access the FollowMyHealth Patient Portal offered by [facility] by registering at the following website: [email or web address]. By joining [employer]’s FollowMyHealth portal, you will also be able to view your health information using other applications (apps) compatible with our system. You can access the FollowMyHealth Patient Portal offered by [facility] by registering at the following website: [email or web address]. By joining [employer]’s FollowMyHealth portal, you will also be able to view your health information using other applications (apps) compatible with our system.

## 2024-01-06 NOTE — CHART NOTE - NSCHARTNOTEFT_GEN_A_CORE
SURGERY POST OP CHECK    STATUS POST PROCEDURE:    SUBJECTIVE: Pt seen and examined without complaints. Pain is controlled. Tolerating diet. Denies CP/SOB/N/V.           OBJECTIVE:  Vital Signs Last 24 Hrs  T(C): 36.9 (05 Jan 2024 23:10), Max: 37 (05 Jan 2024 19:16)  T(F): 98.4 (05 Jan 2024 23:10), Max: 98.6 (05 Jan 2024 19:16)  HR: 73 (05 Jan 2024 23:10) (54 - 74)  BP: 127/67 (05 Jan 2024 23:10) (106/58 - 135/76)  BP(mean): 85 (05 Jan 2024 20:45) (76 - 89)  RR: 18 (05 Jan 2024 23:10) (16 - 18)  SpO2: 93% (05 Jan 2024 23:10) (93% - 100%)    Parameters below as of 05 Jan 2024 23:10  Patient On (Oxygen Delivery Method): room air      I&O's Summary    04 Jan 2024 07:01  -  05 Jan 2024 07:00  --------------------------------------------------------  IN: 1435 mL / OUT: 1300 mL / NET: 135 mL    05 Jan 2024 07:01  -  06 Jan 2024 00:35  --------------------------------------------------------  IN: 1300 mL / OUT: 2500 mL / NET: -1200 mL        PHYSICAL EXAM:  Gen: NAD, A&Ox3  Pulm: No respiratory distress, no subcostal retractions  CV: RRR, no JVD  Abd: Soft, NT, ND, port site incision dressings c/d/i  Extremities: FROM, warm and well perfused, equal bilateral muscle strength    ASSESSMENT/PLAN: HPI:  Patient is a 68 year old male with no significant PMHx per report presenting with epigastric abdominal pain for the past day. Associated nausea and vomiting. Denies diarrhea, fevers, and chills. Pain was after eating yesterday and has been persistent. Denies prior history of gallstones. Past surgical history for open appendectomy approximately 20 years ago. In ED, CT imaging findings concerning for acute cholecystitis. (04 Jan 2024 12:42)    Now s/p laparoscopic cholecystectomy. Tolerated the procedure well.     Plan    - Regular diet  - Pain control PRN  - OOB as tolerated  - Plan for discharge today     ACS/Trauma  [id number]

## 2024-01-06 NOTE — PROGRESS NOTE ADULT - ASSESSMENT
68 year old male with likely acute cholecystitis based on clinical presentation and CT radiographic findings. Presenting with leukocytosis. Patient s/p POD 0 Lap garcia, tolerating diet, hemodynamically stable. with adequate bowel function.    Plan:  - Discharge today  - Regular diet  - No abx  - Pain control  - Monitor I/Os    ACS/Trauma Surgery  [id number]   68 year old male with likely acute cholecystitis based on clinical presentation and CT radiographic findings. Presenting with leukocytosis. Patient s/p POD 0 Lap garcia, tolerating diet, hemodynamically stable. with adequate bowel function.    Plan:  - Discharge today  - Regular diet  - No abx  - Pain control  - Monitor I/Os    ACS/Trauma Surgery  [id number]

## 2024-01-06 NOTE — DISCHARGE NOTE NURSING/CASE MANAGEMENT/SOCIAL WORK - NSDCPEFALRISK_GEN_ALL_CORE
For information on Fall & Injury Prevention, visit: [email or web address] OR  [email or web address] OR  https://www.cdc.gov/steadi/patient.html For information on Fall & Injury Prevention, visit: [email or web address] OR  [email or web address] OR  https://www.cdc.gov/steadi/patient.html

## 2024-01-11 PROBLEM — Z78.9 OTHER SPECIFIED HEALTH STATUS: Chronic | Status: ACTIVE | Noted: 2024-01-04

## 2024-01-16 LAB — SURGICAL PATHOLOGY STUDY: SIGNIFICANT CHANGE UP

## 2024-01-22 ENCOUNTER — APPOINTMENT (OUTPATIENT)
Dept: TRAUMA SURGERY | Facility: CLINIC | Age: 69
End: 2024-01-22
Payer: MEDICAID

## 2024-01-22 DIAGNOSIS — K81.0 ACUTE CHOLECYSTITIS: ICD-10-CM

## 2024-01-22 PROBLEM — Z00.00 ENCOUNTER FOR PREVENTIVE HEALTH EXAMINATION: Status: ACTIVE | Noted: 2024-01-22

## 2024-01-22 PROCEDURE — 99024 POSTOP FOLLOW-UP VISIT: CPT

## 2024-11-18 NOTE — H&P ADULT - REASON FOR ADMISSION
General: alert, oriented to person, time, place  Psych: mood appropriate  Head: normocephalic; atraumatic  Eyes: PERRLA, EOMI, conjunctivae clear bilaterally, sclerae anicteric  ENT: no nasal flaring, patent nares  Cardio: RRR, no m/r/g, pulses 2+ b/l  Resp: CTAB, no w/r/r  GI: soft/nondistended/nontender  Back: no spinous process tenderness  Neuro: normal sensation, moving all four extremities equally  Skin: No evidence of rash or bruising  MSK: normal movement of all extremities, no chest wall tenderness  Lymph/Vasc: no LE edema
Acute cholecystitis

## (undated) DEVICE — DRAPE C ARM UNIVERSAL

## (undated) DEVICE — GLV 8 PROTEXIS (WHITE)

## (undated) DEVICE — POSITIONER FOAM EGG CRATE ULNAR 2PCS (PINK)

## (undated) DEVICE — SYR LUER LOK 20CC

## (undated) DEVICE — GLV 8.5 PROTEXIS (WHITE)

## (undated) DEVICE — MEDICATION LABELS W MARKER

## (undated) DEVICE — DISSECTOR ENDO PEANUT 5MM

## (undated) DEVICE — DRSG OPSITE 2.5 X 2"

## (undated) DEVICE — SYR LUER LOK 30CC

## (undated) DEVICE — TROCAR COVIDIEN VERSAONE FIXATION CANNULA 5MM

## (undated) DEVICE — GOWN TRIMAX LG

## (undated) DEVICE — TUBING TRUWAVE PRESSURE MALE/FEMALE 72"

## (undated) DEVICE — SOL IRR POUR NS 0.9% 500ML

## (undated) DEVICE — SHEARS COVIDIEN ENDO SHEAR 5MM X 31CM W UNIPOLAR CAUTERY

## (undated) DEVICE — TROCAR APPLIED MEDICAL KII BALLOON BLUNT TIP 12MM X 100MM

## (undated) DEVICE — DRAPE MAYO STAND 30"

## (undated) DEVICE — WARMING BLANKET UPPER ADULT

## (undated) DEVICE — GLV 7 PROTEXIS (WHITE)

## (undated) DEVICE — STOPCOCK 3 WAY W TUBE 35"

## (undated) DEVICE — ENDOCATCH 10MM SPECIMEN POUCH

## (undated) DEVICE — GLV 6.5 PROTEXIS (WHITE)

## (undated) DEVICE — CATH IV SAFE INSYTE 14G X 1.75" (ORANGE)

## (undated) DEVICE — DRSG TELFA 3 X 8

## (undated) DEVICE — SPECIMEN CONTAINER 100ML

## (undated) DEVICE — SUT PDS II 0 18" ENDOLOOP LIGATURE

## (undated) DEVICE — DRAPE INSTRUMENT POUCH 6.75" X 11"

## (undated) DEVICE — PACK ADVANCED LAPAROSCOPIC NS

## (undated) DEVICE — DRAPE TOWEL BLUE 17" X 24"

## (undated) DEVICE — TUBING INSUFFLATION LAP FILTER 10FT

## (undated) DEVICE — SUT BIOSYN 4-0 18" P-12

## (undated) DEVICE — SOL IRR POUR H2O 250ML

## (undated) DEVICE — TUBING IRRIGATION DAVOL SYSTEM X STREAM

## (undated) DEVICE — TUBING STRYKEFLOW II SUCTION / IRRIGATOR

## (undated) DEVICE — TROCAR COVIDIEN VERSAPORT BLADELESS OPTICAL 5MM STANDARD

## (undated) DEVICE — DISSECTOR COVIDIEN ROTICULATOR 5MM W MONOPOLAR CAUTERY

## (undated) DEVICE — GLV 7.5 PROTEXIS (WHITE)

## (undated) DEVICE — D HELP - CLEARVIEW CLEARIFY SYSTEM

## (undated) DEVICE — SUT POLYSORB 0 36" GU-46

## (undated) DEVICE — VENODYNE/SCD SLEEVE CALF LARGE

## (undated) DEVICE — PREP CHLORAPREP HI-LITE ORANGE 26ML

## (undated) DEVICE — TROCAR COVIDIEN BLUNT TIP HASSAN 10MM

## (undated) DEVICE — ELCTR CORD FOOTSWITCH 1PLR LAPSCP 10FT

## (undated) DEVICE — DRSG STERISTRIPS 0.5 X 4"

## (undated) DEVICE — TUBING TUR 2 PRONG

## (undated) DEVICE — NDL BIOPSY MONOPTY 18G X 20CM